# Patient Record
(demographics unavailable — no encounter records)

---

## 2025-01-13 NOTE — DISCUSSION/SUMMARY
[FreeTextEntry1] : Assessment: Patient is a 58 yo female with h/o bipolar depression and anxiety seen today for medication management. Patient is compliant with their medications, tolerating it well without any side effects. I-STOP was checked without any problems.   Plan:  Continue Lamotrigine 200 mg QD for bipolar depression Continue Abilify 5 mg QD for augmentation Continue Xanax 0.25 mg # 15  PRN (no renewal needed as she had) Continue Prozac 80 mg QD for anxiety D/C Trazodone 50 mg QHS for insomnia - Discussed risks and benefits of medications including side effects of GI and sexual with SSRI. Alternative strategies including no intervention discussed with patient. Patient consents to current medications as prescribed. - Patient understands to contact clinic prn with concerns and agrees to call 911 or go to nearest ER if symptoms worsen. - Next appointment made in 3 month. Patient was not in any distress.

## 2025-01-13 NOTE — HISTORY OF PRESENT ILLNESS
[Home] : at home, [unfilled] , at the time of the visit. [Medical Office: ([facility])___] : at the medical office located in  [Verbal consent obtained from patient] : the patient, [unfilled] [de-identified] : The following service was provided using telehealth between writer/provider and patient. The patient was at home. The writer was at clinic. Patient was alone and consented to telehealth format. All treatment plans through and including today's date were reviewed with the patient.  Patient is here for 3-month follow-up visit via telehealth.  No medication changes at that time. States her knee is doing very well. Walking. NO plain. No more PT.   Mood: very good. Anxiety is very manageable. No Xanax this month. Has 7 left.  Sleep: 6-8 hours full  Appetite: good Energy is improved Concentration and motivation both improved.   Denies any AVH, SI or HI Social drinker  [No] : no

## 2025-01-13 NOTE — PHYSICAL EXAM
[None] : none [Rapid] : not rapid [Talkative] : talkative [Suicidal Ideation] : no suicidal ideation [Homicidal Ideation] : no homicidal ideation [Anxious] : anxious [Afraid] : not afraid [Appropriate] : appropriate [Irritable] : no irritable [Dysphoric] : not dysphoric [Normal] : good [FreeTextEntry8] : stressed

## 2025-04-11 NOTE — HISTORY OF PRESENT ILLNESS
[de-identified] : The following service was provided using telehealth between writer/provider and patient. The patient was at home. The writer was at clinic. Patient was alone and consented to telehealth format. All treatment plans through and including today's date were reviewed with the patient.  Patient is here for 3-month follow-up visit via telehealth.  No medication changes at that time.   Mood: very good. Anxiety is very manageable. No Xanax this month. Has 5 left.  Sleep: 6-8 hours full  Appetite: good Energy is improved Concentration and motivation both improved.   Denies any AVH, SI or HI Social drinker Going to [place] end of July.

## 2025-04-11 NOTE — DISCUSSION/SUMMARY
[FreeTextEntry1] : Assessment: Patient is a 56 yo female with h/o bipolar depression and anxiety seen today for medication management. Patient is compliant with their medications, tolerating it well without any side effects. I-STOP was checked without any problems.   Plan:  Continue Lamotrigine 200 mg QD for bipolar depression Continue Abilify 5 mg QD for augmentation Continue Xanax 0.25 mg # 15  PRN (no renewal needed as she had) Continue Prozac 80 mg QD for anxiety D/C Trazodone 50 mg QHS for insomnia - Discussed risks and benefits of medications including side effects of GI and sexual with SSRI. Alternative strategies including no intervention discussed with patient. Patient consents to current medications as prescribed. - Patient understands to contact clinic prn with concerns and agrees to call 911 or go to nearest ER if symptoms worsen. - Next appointment made in 3 month. Patient was not in any distress.

## 2025-04-11 NOTE — REASON FOR VISIT
[Telehealth (audio & video) - Individual/Group] : This visit was provided via telehealth using real-time 2-way audio visual technology. [Patient preference] : Patient preference. [Medical Office: ([facility])___] : The provider was located at the medical office in [unfilled]. [Home] : The patient, [unfilled], was located at home, [unfilled], at the time of the visit. [Participant(s) identity verified] : Participant(s) identity verified. [Verbal consent obtained from patient/other participant(s)] : Verbal consent for telehealth/telephonic services obtained from patient/other participant(s) [Patient] : Patient

## 2025-07-11 NOTE — REASON FOR VISIT
[Patient preference] : as per patient preference [Telehealth (audio & video) - Individual/Group] : This visit was provided via telehealth using real-time 2-way audio visual technology. [Medical Office: ([facility])___] : The provider was located at the medical office in [unfilled]. [Home] : The patient, [unfilled], was located at home, [unfilled], at the time of the visit. [Participant(s) identity verified] : Participant(s) identity verified. [Verbal consent obtained from patient/other participant(s)] : Verbal consent for telehealth/telephonic services obtained from patient/other participant(s) [Patient] : Patient

## 2025-07-11 NOTE — HISTORY OF PRESENT ILLNESS
[de-identified] : Patient is here for 3-month follow-up visit via telehealth.  No medication changes at that time. States she is going to [facility] from JUly 29-Sept 2 for her daughter's wedding. States she tripped and fell. Had 10 stiches done on her. Took a Xanax as she was so upset it happened to her.   Mood: very good. Anxiety is very manageable.  Sleep: 6-8 hours full  Appetite: good Energy is improved Concentration and motivation both improved.   Denies any AVH, SI or HI Social drinker

## 2025-07-11 NOTE — DISCUSSION/SUMMARY
[FreeTextEntry1] : Assessment: Patient is a 56 yo female with h/o bipolar depression and anxiety seen today for medication management. Patient is compliant with their medications, tolerating it well without any side effects. I-STOP was checked without any problems.   Plan:  Continue Lamotrigine 200 mg QD for bipolar depression Continue Abilify 5 mg QD for augmentation Continue Xanax 0.25 mg # 15  PRN (no renewal needed as she had) Continue Prozac 80 mg QD for anxiety D/C Trazodone 50 mg QHS for insomnia - Discussed risks and benefits of medications including side effects of GI and sexual with SSRI. Alternative strategies including no intervention discussed with patient. Patient consents to current medications as prescribed. - Patient understands to contact clinic prn with concerns and agrees to call 911 or go to nearest ER if symptoms worsen. - Next appointment made in 2 months. Patient was not in any distress.